# Patient Record
Sex: MALE | Race: WHITE | HISPANIC OR LATINO | Employment: UNEMPLOYED | URBAN - METROPOLITAN AREA
[De-identification: names, ages, dates, MRNs, and addresses within clinical notes are randomized per-mention and may not be internally consistent; named-entity substitution may affect disease eponyms.]

---

## 2023-11-12 ENCOUNTER — APPOINTMENT (OUTPATIENT)
Dept: GENERAL RADIOLOGY | Facility: CLINIC | Age: 28
End: 2023-11-12
Attending: EMERGENCY MEDICINE

## 2023-11-12 ENCOUNTER — HOSPITAL ENCOUNTER (EMERGENCY)
Facility: CLINIC | Age: 28
Discharge: HOME | End: 2023-11-12
Attending: EMERGENCY MEDICINE | Admitting: EMERGENCY MEDICINE

## 2023-11-12 VITALS
OXYGEN SATURATION: 98 % | RESPIRATION RATE: 18 BRPM | SYSTOLIC BLOOD PRESSURE: 165 MMHG | DIASTOLIC BLOOD PRESSURE: 109 MMHG | HEART RATE: 95 BPM | TEMPERATURE: 97.5 F

## 2023-11-12 DIAGNOSIS — R07.89 CHEST WALL PAIN: ICD-10-CM

## 2023-11-12 PROCEDURE — 71046 X-RAY EXAM CHEST 2 VIEWS: CPT

## 2023-11-12 PROCEDURE — 93005 ELECTROCARDIOGRAM TRACING: CPT

## 2023-11-12 PROCEDURE — 99284 EMERGENCY DEPT VISIT MOD MDM: CPT | Mod: 25

## 2023-11-12 ASSESSMENT — ACTIVITIES OF DAILY LIVING (ADL): ADLS_ACUITY_SCORE: 35

## 2023-11-13 LAB
ATRIAL RATE - MUSE: 94 BPM
DIASTOLIC BLOOD PRESSURE - MUSE: NORMAL MMHG
INTERPRETATION ECG - MUSE: NORMAL
P AXIS - MUSE: 34 DEGREES
PR INTERVAL - MUSE: 118 MS
QRS DURATION - MUSE: 84 MS
QT - MUSE: 354 MS
QTC - MUSE: 442 MS
R AXIS - MUSE: -28 DEGREES
SYSTOLIC BLOOD PRESSURE - MUSE: NORMAL MMHG
T AXIS - MUSE: -2 DEGREES
VENTRICULAR RATE- MUSE: 94 BPM

## 2023-11-13 NOTE — ED PROVIDER NOTES
History     Chief Complaint:  Chest Pain       HPI   Gerson O Reyes is a 28 year old male who presents to the ED for evaluation of chest pain. Patient reports that for the past 3 weeks he has been having intermittent sharp chest discomfort. Patient reports that it is worse at night and normally he begins to feel the pain in the evening. Notices it the most when not doing anything. Patient says that he doesn't feel like his past panic or anxiety attacks but he has been stressed recently. Also endorses lack of sleep. Does have a mild sore throat but says this is not off baseline since patient is a armenta. Standing or sitting does not exacerbates it. Symptoms nonexertional. Denies fevers, cough, recent injuries, nausea, vomiting, leg swelling. Reports a 50 lbs weight gain since graduating college within the last few years. Worked out 2 days ago and had arm pain and numbness.    Independent Historian:   None - Patient Only    Review of External Notes:  None    ROS:  See HPI    Allergies:  No Known Allergies     Physical Exam   Patient Vitals for the past 24 hrs:   BP Temp Temp src Pulse Resp SpO2   11/12/23 2007 (!) 165/109 97.5  F (36.4  C) Temporal 95 18 98 %      Physical Exam  General: Well appearing, nontoxic. Resting comfortably  Head:  Scalp, face, and head appear normal  Eyes:  Pupils are equal, round    Conjunctivae non-injected and sclerae white  ENT:    The external nose is normal    Pinnae are normal  Neck:  Normal range of motion    There is no rigidity noted    Trachea is in the midline  CV:  Regular rate and rhythm     Normal S1/S2, no S3/S4    No murmur or rub. Radial pulses 2+ bilaterally.  Resp:  Lungs are clear and equal bilaterally  There is no tachypnea    No increased work of breathing    No rales, wheezing, or rhonchi  GI:  Abdomen is soft, no rigidity or guarding    No distension, or mass    No tenderness or rebound tenderness   MS:  Normal muscular tone. Chest wall non tender to  palpation.    Symmetric motor strength    No lower extremity edema. No calf swelling or tenderness.  Skin:  No rash or acute skin lesions noted  Neuro:  Awake and alert  Speech is normal and fluent  Moves all extremities spontaneously  Psych: Anxious affect. Appropriate interactions.     Emergency Department Course   ECG  ECG taken at 2003, ECG read at 2133  Normal sinus rhythm   Normal ECG   Rate 94 bpm. CA interval 118 ms. QRS duration 84 ms. QT/QTc 354/442 ms. P-R-T axes 34 -28 -2.     Imaging:  XR Chest 2 Views   Final Result   IMPRESSION: Negative chest.         Report per radiology    Procedures     Emergency Department Course & Assessments:       Interventions:  Medications - No data to display     Assessments, Independent Interpretation, Consult/Discussion of ManagementTests:  ED Course as of 11/14/23 1500   Sun Nov 12, 2023 2137 I obtained history and examined the patient as noted above.    2327 On my independent interpretation of the patient's chest radiograph(s) there is no pneumothorax or large pleural effusions.       Social Determinants of Health affecting care:  None    Disposition:  The patient was discharged to home.     Impression & Plan      Medical Decision Making:  Gerson O Reyes is a 28 year old male who presents to the ED for evaluation of atypical chest pain.  ED evaluation is thankfully reassuring.  EKG without evidence of acute ischemia or dysrhythmia.  Signs and symptoms are not consistent with pulmonary embolism or acute aortic pathology.  Chest x-ray is unremarkable without evidence of pneumonia, pulmonary infiltrate, pneumothorax, pleural effusion, pulmonary edema, nodules or any other abnormalities.  No indication for laboratory studies at this time.  Overall clinical picture is not consistent with acute coronary syndrome or other primary cardiac etiology.  Symptoms are felt to be most related to either musculoskeletal pain and/or anxiety.  I recommended supportive care at home.   Close follow-up with PCP if not improving.  Close return precautions were provided and patient was discharged in stable condition.      Diagnosis:    ICD-10-CM    1. Chest wall pain  R07.89            Discharge Medications:  There are no discharge medications for this patient.     Scribe Disclosure:  I, Darius Leahy, am serving as a scribe at 9:36 PM on 11/12/2023 to document services personally performed by Hai Suero MD based on my observations and the provider's statements to me.    11/12/2023   Hai Suero MD     Historical Data:  ______________________________________________________________________  Medications:    The patient is not currently taking any prescribed medications.    Past Medical History:   Past Surgical History:     No pertinent past medical history.  No past surgical history on file.   There are no problems to display for this patient.         Family History:    family history is not on file. Social History:        PCP: No primary care provider on file.         Hai Suero MD  11/14/23 [id number]

## 2023-11-13 NOTE — ED NOTES
Cardiac (Adult)Cardiac WDL:  (pt comes in with intermittent chest pain for the last 3 weeks, pt reports pain starts to worsen in the evening and worsens overnight. pt reports weight gain of 50lbs over last few years. no recent injury.)Cardiac Rhythm: NSR

## 2023-11-13 NOTE — ED TRIAGE NOTES
Arrives from home. States left chest pain for 3 weeks. States pain is in his left arm as well. States anxiety at baseline.